# Patient Record
Sex: FEMALE | Race: WHITE | Employment: UNEMPLOYED | URBAN - NONMETROPOLITAN AREA
[De-identification: names, ages, dates, MRNs, and addresses within clinical notes are randomized per-mention and may not be internally consistent; named-entity substitution may affect disease eponyms.]

---

## 2023-02-19 ENCOUNTER — OFFICE VISIT (OUTPATIENT)
Dept: PRIMARY CARE CLINIC | Age: 48
End: 2023-02-19

## 2023-02-19 ENCOUNTER — HOSPITAL ENCOUNTER (OUTPATIENT)
Age: 48
Setting detail: SPECIMEN
Discharge: HOME | End: 2023-02-19
Payer: MEDICARE

## 2023-02-19 VITALS
RESPIRATION RATE: 18 BRPM | DIASTOLIC BLOOD PRESSURE: 80 MMHG | OXYGEN SATURATION: 97 % | SYSTOLIC BLOOD PRESSURE: 122 MMHG | HEART RATE: 104 BPM | HEIGHT: 57 IN | TEMPERATURE: 99 F | BODY MASS INDEX: 29.12 KG/M2 | WEIGHT: 135 LBS

## 2023-02-19 DIAGNOSIS — J02.9 SORE THROAT: ICD-10-CM

## 2023-02-19 DIAGNOSIS — J01.00 ACUTE NON-RECURRENT MAXILLARY SINUSITIS: Primary | ICD-10-CM

## 2023-02-19 DIAGNOSIS — N30.01 ACUTE CYSTITIS WITH HEMATURIA: ICD-10-CM

## 2023-02-19 LAB
BACTERIA: ABNORMAL
BILIRUBIN URINE: ABNORMAL
COLOR: YELLOW
EPITHELIAL CELLS UA: ABNORMAL /HPF (ref 0–5)
GLUCOSE UR STRIP.AUTO-MCNC: NEGATIVE MG/DL
KETONES UR STRIP.AUTO-MCNC: NEGATIVE MG/DL
LEUKOCYTE ESTERASE UR QL STRIP.AUTO: ABNORMAL
MUCUS: ABNORMAL
NITRITE UR QL STRIP.AUTO: NEGATIVE
OTHER OBSERVATIONS UA: ABNORMAL
PROT UR STRIP.AUTO-MCNC: 6.5 MG/DL (ref 5–6)
PROT UR STRIP.AUTO-MCNC: ABNORMAL MG/DL
RBC CLUMPS #/AREA URNS AUTO: ABNORMAL /HPF (ref 0–4)
S PYO AG THROAT QL: NORMAL
SPECIFIC GRAVITY UA: 1.02 (ref 1.01–1.02)
TURBIDITY: ABNORMAL
URINE HGB: ABNORMAL
UROBILINOGEN, URINE: NORMAL
WBC UA: ABNORMAL /HPF (ref 0–4)

## 2023-02-19 PROCEDURE — 99202 OFFICE O/P NEW SF 15 MIN: CPT

## 2023-02-19 PROCEDURE — 87880 STREP A ASSAY W/OPTIC: CPT

## 2023-02-19 PROCEDURE — 81003 URINALYSIS AUTO W/O SCOPE: CPT

## 2023-02-19 PROCEDURE — 81001 URINALYSIS AUTO W/SCOPE: CPT

## 2023-02-19 PROCEDURE — 87086 URINE CULTURE/COLONY COUNT: CPT

## 2023-02-19 RX ORDER — FLUTICASONE PROPIONATE 50 MCG
2 SPRAY, SUSPENSION (ML) NASAL DAILY
Qty: 16 G | Refills: 5 | Status: SHIPPED | OUTPATIENT
Start: 2023-02-19

## 2023-02-19 RX ORDER — GUAIFENESIN 600 MG/1
600 TABLET, EXTENDED RELEASE ORAL 2 TIMES DAILY
Qty: 30 TABLET | Refills: 0 | Status: SHIPPED | OUTPATIENT
Start: 2023-02-19 | End: 2023-03-06

## 2023-02-19 RX ORDER — NITROFURANTOIN 25; 75 MG/1; MG/1
100 CAPSULE ORAL 2 TIMES DAILY
Qty: 14 CAPSULE | Refills: 0 | Status: SHIPPED | OUTPATIENT
Start: 2023-02-19 | End: 2023-02-26

## 2023-02-19 RX ORDER — DESVENLAFAXINE 50 MG/1
TABLET, EXTENDED RELEASE ORAL
COMMUNITY

## 2023-02-19 RX ORDER — RISPERIDONE 0.5 MG/1
TABLET ORAL
COMMUNITY
Start: 2023-01-21

## 2023-02-19 RX ORDER — PHENYTOIN SODIUM 100 MG/1
CAPSULE, EXTENDED RELEASE ORAL
COMMUNITY
Start: 2023-02-15

## 2023-02-19 RX ORDER — FERROUS SULFATE 325(65) MG
325 TABLET ORAL
COMMUNITY

## 2023-02-19 RX ORDER — LAMOTRIGINE 25 MG/1
TABLET ORAL
COMMUNITY
Start: 2023-01-25

## 2023-02-19 RX ORDER — FENOFIBRATE 145 MG/1
TABLET, COATED ORAL
COMMUNITY
Start: 2019-02-27

## 2023-02-19 RX ORDER — MELOXICAM 7.5 MG/1
7.5 TABLET ORAL DAILY
COMMUNITY

## 2023-02-19 RX ORDER — ESOMEPRAZOLE MAGNESIUM 40 MG/1
CAPSULE, DELAYED RELEASE ORAL
COMMUNITY
Start: 2016-10-24

## 2023-02-19 RX ORDER — AMOXICILLIN AND CLAVULANATE POTASSIUM 875; 125 MG/1; MG/1
1 TABLET, FILM COATED ORAL 2 TIMES DAILY
Qty: 20 TABLET | Refills: 0 | Status: SHIPPED | OUTPATIENT
Start: 2023-02-19 | End: 2023-03-01

## 2023-02-19 RX ORDER — CHOLECALCIFEROL (VITAMIN D3) 125 MCG
CAPSULE ORAL
COMMUNITY
Start: 2021-07-28

## 2023-02-19 RX ORDER — LAMOTRIGINE 200 MG/1
TABLET ORAL
COMMUNITY
Start: 2023-01-25

## 2023-02-19 SDOH — ECONOMIC STABILITY: HOUSING INSECURITY
IN THE LAST 12 MONTHS, WAS THERE A TIME WHEN YOU DID NOT HAVE A STEADY PLACE TO SLEEP OR SLEPT IN A SHELTER (INCLUDING NOW)?: NO

## 2023-02-19 SDOH — ECONOMIC STABILITY: INCOME INSECURITY: HOW HARD IS IT FOR YOU TO PAY FOR THE VERY BASICS LIKE FOOD, HOUSING, MEDICAL CARE, AND HEATING?: NOT HARD AT ALL

## 2023-02-19 SDOH — ECONOMIC STABILITY: FOOD INSECURITY: WITHIN THE PAST 12 MONTHS, THE FOOD YOU BOUGHT JUST DIDN'T LAST AND YOU DIDN'T HAVE MONEY TO GET MORE.: NEVER TRUE

## 2023-02-19 SDOH — ECONOMIC STABILITY: FOOD INSECURITY: WITHIN THE PAST 12 MONTHS, YOU WORRIED THAT YOUR FOOD WOULD RUN OUT BEFORE YOU GOT MONEY TO BUY MORE.: NEVER TRUE

## 2023-02-19 ASSESSMENT — ENCOUNTER SYMPTOMS
SINUS PAIN: 0
BACK PAIN: 0
CONSTIPATION: 0
SINUS PRESSURE: 0
SHORTNESS OF BREATH: 0
NAUSEA: 0
VOMITING: 0
WHEEZING: 0
BLOOD IN STOOL: 0
COUGH: 1
SORE THROAT: 0
ABDOMINAL PAIN: 0
DIARRHEA: 0
RHINORRHEA: 1

## 2023-02-19 ASSESSMENT — VISUAL ACUITY: OU: 1

## 2023-02-19 NOTE — LETTER
[facility] A department of [place] [address]  Phone: [phone]  Fax: [phone]    BRUCE West CNP        February 19, 2023     Patient: Tristen Ronquillo   YOB: 1975   Date of Visit: 2/19/2023       To Whom It May Concern: It is my medical opinion that Tristen Ronquillo may return to work on 2/19/2023. If you have any questions or concerns, please don't hesitate to call.     Sincerely,        BRUCE West CNP

## 2023-02-19 NOTE — PROGRESS NOTES
[facility] A department of [facility]  [facility]  Phone: [phone]  Fax: [phone]      Jony Floyd is a 56 y.o. female who presents to the [facility] today for her medical conditions/complaints as noted below. Jony Floyd is c/o of URI (Pt ill since Monday with nasal congestion, cough, sore throat. Negative home covid test Wed and Fri. Pt also grabbing pelvic area. )          HPI:     URI   This is a new problem. The current episode started in the past 7 days (x1 week). The problem has been unchanged. There has been no fever. Associated symptoms include congestion, coughing and rhinorrhea. Pertinent negatives include no abdominal pain, chest pain, diarrhea, dysuria, ear pain, headaches, nausea, neck pain, rash, sinus pain, sneezing, sore throat, vomiting or wheezing. Treatments tried: robitussin. The treatment provided no relief. History reviewed. No pertinent past medical history. Allergies   Allergen Reactions    Sulfa Antibiotics        Wt Readings from Last 3 Encounters:   02/19/23 135 lb (61.2 kg)     BP Readings from Last 3 Encounters:   02/19/23 122/80      Temp Readings from Last 3 Encounters:   02/19/23 99 °F (37.2 °C) (Tympanic)     Pulse Readings from Last 3 Encounters:   02/19/23 (!) 104     SpO2 Readings from Last 3 Encounters:   02/19/23 97%       Subjective:      Review of Systems   Constitutional:  Negative for fatigue and fever. HENT:  Positive for congestion and rhinorrhea. Negative for ear pain, sinus pressure, sinus pain, sneezing and sore throat. Respiratory:  Positive for cough. Negative for shortness of breath and wheezing. Cardiovascular:  Negative for chest pain and palpitations. Gastrointestinal:  Negative for abdominal pain, blood in stool, constipation, diarrhea, nausea and vomiting. Endocrine: Negative for polydipsia and polyuria. Genitourinary:  Positive for frequency. Negative for decreased urine volume, dysuria, flank pain, hematuria, urgency, vaginal bleeding, vaginal discharge and vaginal pain. Musculoskeletal:  Negative for back pain, gait problem and neck pain. Skin:  Negative for rash and wound. Neurological:  Negative for dizziness, seizures and headaches. Hematological:  Negative for adenopathy. Does not bruise/bleed easily. Psychiatric/Behavioral:  Negative for dysphoric mood. The patient is not nervous/anxious. Objective:     Vitals:    02/19/23 1352   BP: 122/80   Site: Left Upper Arm   Position: Sitting   Cuff Size: Medium Adult   Pulse: (!) 104   Resp: 18   Temp: 99 °F (37.2 °C)   TempSrc: Tympanic   SpO2: 97%   Weight: 135 lb (61.2 kg)   Height: 4' 9\" (1.448 m)     Body mass index is 29.21 kg/m². /80 (Site: Left Upper Arm, Position: Sitting, Cuff Size: Medium Adult)   Pulse (!) 104   Temp 99 °F (37.2 °C) (Tympanic)   Resp 18   Ht 4' 9\" (1.448 m)   Wt 135 lb (61.2 kg)   SpO2 97%   BMI 29.21 kg/m²   Physical Exam  Vitals reviewed. Constitutional:       General: She is not in acute distress. HENT:      Head: Normocephalic. Right Ear: Hearing, tympanic membrane and ear canal normal.      Left Ear: Ear canal normal. Decreased hearing noted. There is impacted cerumen. Nose: Mucosal edema, congestion and rhinorrhea present. No laceration. Right Turbinates: Swollen. Left Turbinates: Swollen. Right Sinus: No maxillary sinus tenderness or frontal sinus tenderness. Left Sinus: No maxillary sinus tenderness or frontal sinus tenderness. Mouth/Throat:      Lips: Pink. Mouth: Mucous membranes are dry. Pharynx: Oropharynx is clear. Eyes:      General: Lids are normal. Vision grossly intact. Extraocular Movements: Extraocular movements intact. Conjunctiva/sclera: Conjunctivae normal.      Pupils: Pupils are equal, round, and reactive to light.    Cardiovascular:      Rate and Rhythm: Normal rate and regular rhythm. Heart sounds: No murmur heard. Pulmonary:      Effort: Pulmonary effort is normal.      Breath sounds: Normal breath sounds. Abdominal:      General: Abdomen is protuberant. Bowel sounds are normal.      Palpations: Abdomen is soft. Tenderness: There is abdominal tenderness in the suprapubic area. There is no right CVA tenderness, left CVA tenderness or rebound. Musculoskeletal:         General: No swelling. Cervical back: Full passive range of motion without pain, normal range of motion and neck supple. Neurological:      General: No focal deficit present. Mental Status: She is alert and oriented to person, place, and time. Psychiatric:         Mood and Affect: Mood normal.         Behavior: Behavior normal.       Assessment and Plan      Diagnosis Orders   1. Acute non-recurrent maxillary sinusitis  guaiFENesin (MUCINEX) 600 MG extended release tablet    fluticasone (FLONASE) 50 MCG/ACT nasal spray    amoxicillin-clavulanate (AUGMENTIN) 875-125 MG per tablet      2. Acute cystitis with hematuria  nitrofurantoin, macrocrystal-monohydrate, (MACROBID) 100 MG capsule      3. Sore throat  POCT rapid strep A    Urinalysis with Reflex to Culture        Orders Placed This Encounter    Urinalysis with Reflex to Culture     Standing Status:   Future     Number of Occurrences:   1     Standing Expiration Date:   2024     Order Specific Question:   SPECIFY(EX-CATH,MIDSTREAM,CYSTO,ETC)?      Answer:   midstream    POCT rapid strep A                                                                                                              guaiFENesin (MUCINEX) 600 MG extended release tablet     Sig: Take 1 tablet by mouth 2 times daily for 15 days     Dispense:  30 tablet     Refill:  0    fluticasone (FLONASE) 50 MCG/ACT nasal spray     Si sprays by Each Nostril route daily     Dispense:  16 g     Refill:  5    amoxicillin-clavulanate (AUGMENTIN) 875-125 MG per tablet     Sig: Take 1 tablet by mouth 2 times daily for 10 days     Dispense:  20 tablet     Refill:  0    nitrofurantoin, macrocrystal-monohydrate, (MACROBID) 100 MG capsule     Sig: Take 1 capsule by mouth 2 times daily for 7 days To take once urine culture has resulted     Dispense:  14 capsule     Refill:  0     Discussed positive UA results with guardian of patient. She states that this has happened in the past however when sent for culture comes back negative. Will send in script for antibiotic however if culture comes back negative will call caregiver and she may stop antibiotic.     -Sinus pain/pressure with congestion and rhinorrhea over 1  week. -Lungs CTA, no respiratory distress noted  -Augmentin x10 days BID  -Please take probiotic for patient to be on dual antibiotic therapy. Left tympanic membrane normal after irrigation and removal of cerumen with curette. Patient tolerated well. No further treatment is necessary, unless there are recurrent symptoms. Recommended over the counter medications/treatments: The use of an antihistamine (Claritin or Zyrtec) and a nasal steroid spray (Flonase or Nasacort) may help with sinus congestion and drainage. Mucinex will also help thin secretions and improve congestion. Works best if drinking plenty of water. Honey with or without Lemon may also help with coughing. Probiotics daily may help boost immune system. Alternating hot liquids with cold liquids helps to sooth sore throat  Use Acetaminophen (Tylenol) to help relieve fever, chills or body aches. If allowed, you may alternate using ibuprofen (Motrin) and Tylenol. Do not exceed 3,000mg of Tylenol in a 24 hour time period. Make sure to stay well hydrated by drinking plenty of water. Follow up with primary care provider in 1 to 2 days or as needed if no improvement or worsening of your symptoms.          Discussed exam, POCT findings, plan of care, and follow-up at length with patient or patient/guardian. Reviewed all prescribed and recommended medications, administration and side effects. Encouraged patient to follow up with PCP or return to the clinic for no improvement and or worsening of symptoms. All questions were answered and they verbalized understanding and were agreeable with the plan. Follow up as needed.         Electronically signed by BRUCE Hayes CNP on 2/19/2023 at 3:01 PM

## 2023-02-19 NOTE — PROGRESS NOTES
Completed bilateral ear wash per provider request. At the beginning of the wash, pt was not cooperative but easily re-directed by talking about going to [employer]. Started with right ear, and after several minutes and increased pt irritation, writer moved over to wash left ear. Pt increasingly combative. Caregiver that was present assisted writer and attempted to distract patient and hold her hands/arms to keep pt safe because she wash attempting to grab the bottle/hose and writer concerned with pt smacking device used and injuring her eardrum. Able to get significant amount of chunks of ear wax. Water was dark brown that came from left ear. During the left ear, pt was smacking caregiver, grabbing writer by clothes and arms and at one point, smacked the water basin completely out of writer's hands. After clearing left ear, writer moved back to right ear. Pt ripped off cover that was provided to keep water from getting off of her clothes. Pt screamed multiple times and was using arms to signal \"no\". Was able to redirect and again started was on right ear. Pt again very combative and not cooperative, despite significant redirect attempts. Another nurse was called in to assist and wash was completed. Caregiver concerned that pt bruises easily and that it was documented that pt was combative and needed arms/hands to be held at certain times throughout the procedure, in order to keep pt safe. After wash completed, pt was happy, laughing and appeared to be very excited to go to [employer] for cheeseburgers.  She blew writer a kiss as she exited the room

## 2023-02-21 LAB
MICROORGANISM SPEC CULT: NORMAL
SPECIMEN DESCRIPTION: NORMAL

## 2023-02-22 ENCOUNTER — TELEPHONE (OUTPATIENT)
Dept: PRIMARY CARE CLINIC | Age: 48
End: 2023-02-22

## 2023-02-22 NOTE — TELEPHONE ENCOUNTER
----- Message from BRUCE Hurst CNP sent at 2/21/2023  8:46 PM EST -----  Please call and notify patient that their urine culture results did not show any growth of bacteria. No antibiotics are indicated at this time. If they were prescribed antibiotics at the time of their visit, they may stop taking them. No further orders at this time. If no improvement in symptoms with increased fluid intake they should follow up with their PCP.

## 2023-02-22 NOTE — RESULT ENCOUNTER NOTE
Please call and notify patient that their urine culture results did not show any growth of bacteria. No antibiotics are indicated at this time. If they were prescribed antibiotics at the time of their visit, they may stop taking them. No further orders at this time. If no improvement in symptoms with increased fluid intake they should follow up with their PCP.

## 2024-03-05 DIAGNOSIS — J01.00 ACUTE NON-RECURRENT MAXILLARY SINUSITIS: ICD-10-CM

## 2024-03-05 RX ORDER — FLUTICASONE PROPIONATE 50 MCG
SPRAY, SUSPENSION (ML) NASAL
Qty: 16 G | Refills: 0 | OUTPATIENT
Start: 2024-03-05